# Patient Record
Sex: MALE | NOT HISPANIC OR LATINO | URBAN - METROPOLITAN AREA
[De-identification: names, ages, dates, MRNs, and addresses within clinical notes are randomized per-mention and may not be internally consistent; named-entity substitution may affect disease eponyms.]

---

## 2018-06-21 ENCOUNTER — IMPORTED ENCOUNTER (OUTPATIENT)
Dept: URBAN - METROPOLITAN AREA CLINIC 1 | Facility: CLINIC | Age: 58
End: 2018-06-21

## 2018-06-21 PROBLEM — H40.053: Noted: 2018-06-21

## 2018-06-21 PROBLEM — H52.4: Noted: 2018-06-21

## 2018-06-21 PROBLEM — H52.03: Noted: 2018-06-21

## 2018-06-21 PROCEDURE — S0620 ROUTINE OPHTHALMOLOGICAL EXA: HCPCS

## 2018-06-21 NOTE — PATIENT DISCUSSION
1.  Hyperopia OU -- Finalized Glasses MRx was given to patient for corrective spectacles if indicated2. Presbyopia OU3. Cataracts OU -- Observe 4. OHTN OU (CD: 0.40 / 0.40) -- IOP 23 OU today. Negative family h/o Glaucoma. Will monitor & observe for any changes / progression Return for an appointment in 1 YR for a 40 OU with Dr. Atul Braun.

## 2019-01-10 ENCOUNTER — HOSPITAL ENCOUNTER (OUTPATIENT)
Dept: PHYSICAL THERAPY | Age: 59
Discharge: HOME | End: 2019-01-10
Payer: COMMERCIAL

## 2019-01-10 PROCEDURE — 97162 PT EVAL MOD COMPLEX 30 MIN: CPT

## 2019-01-10 NOTE — PROGRESS NOTES
PT DAILY TREATMENT NOTE/LUMBAR EVAL 10-18    Patient Name: Lyssa Gomez  Date:1/10/2019  : 1960  [x]  Patient  Verified  Payor: Barb Bateman / Plan: [employer] RPN / Product Type: Commerical /    In time:1626  Out time:1708  Total Treatment Time (min): 42  Visit #: 1 of 8-10    Treatment Area: Low back pain [M54.5]  SUBJECTIVE  Pain Level (0-10 scale): 2-310  []constant []intermittent []improving []worsening []no change since onset    Any medication changes, allergies to medications, adverse drug reactions, diagnosis change, or new procedure performed?: [x] No    [] Yes (see summary sheet for update)  Subjective functional status/changes:     PLOF: Independent  Limitations to PLOF: Pt is unable to perform sports and quick movements, preventing pt from playing tennis and snow skiing, normal gait  Mechanism of Injury: Fall  Current symptoms/Complaints: Pt c/o Left quad tightness, tingling from Left low back to foot than began 6 to 8 weeks ago. Pt states that he does limp. Pt attributes pain to a fall. Previous Treatment/Compliance: None for this issue   PMHx/Surgical Hx: Pt has history of neck pain, high blood pressure, hearing impaired (has hearing aids); L foot hammertoe surgery Aug 2018  Work Hx: Office work  Pt Goals:  \"Reduce pain and get back to walking normally\"  Cognition: A & O x 4    Other:    OBJECTIVE/EXAMINATION      42 min [x]Eval                  []Re-Eval        Physical Therapy Evaluation - Lumbar Spine ([facility])    SUBJECTIVE  Chief Complaint: L LE weakness, tightness and tingling from L lumbar/side to foot  Mechanism of injury: Fall    Symptoms:  Aggravated by:   [] Bending [] Sitting [] Standing [] Walking   [] Moving [] Cough [] Sneeze [] Valsalva   [] AM  [x] PM  Lying:  [] sup   [] pro   [x] sidelying   [] Other:     Eased by:    [] Bending [] Sitting [] Standing [x] Walking   [] Moving [] AM  [] PM  Lying: [x] sup  [x] pro  [] sidelying   [] Other:     General Health:  Red Flags Indicated? [] Yes    [x] No  [] Yes [x] No Recent weight change (If yes, due to dieting?  [] Yes  [] No)   [] Yes [x] No Weakness in legs during walking  [] Yes [x] No Unremitting pain at night  [] Yes [x] No Abdominal pain or problems  [] Yes [x] No Rectal bleeding  [x] Yes [] No Feet more cold or painful in cold weather  [] Yes [x] No Menstrual irregularities  [] Yes [x] No Blood or pain with urination  [] Yes [x] No Dysfunction of bowel or bladder  [x] Yes [] No Recent illness within past 3 weeks (i.e, cold, flu) - cold (treated)  [] Yes [x] No Numbness/tingling in buttock/genitalia region    Past History/Treatments:     Diagnostic Tests: [] Lab work [x] X-rays    [] CT [] MRI     [] Other:  Results: nothing significant    Functional Status:  What position do you sleep in?: Sidelying    OBJECTIVE  Posture: forward flexed at hip, R shoulder higher than L  Lateral Shift: [] R    [] L     [] +  [] -  Kyphosis: [] Increased [] Decreased   []  WNL  Lordosis:  [] Increased [] Decreased   [] WNL  Pelvic symmetry: [] WNL    [x] Other:  Standing L hip elevated in standing, even in sitting, even in supine  ASIS even in supine  Stands leaning to left - visible crease    Gait:  [] Normal     [x] Abnormal: Antalgic    Active Movements: [] N/A   [] Too acute   [] Other:  ROM AROM Comments:pain, area   Forward flexion 40-60 43 deg     Extension 20-30 5 deg    SB right 20-30 15    SB left 20-30 6 Rotates to achieve side bending   Rotation right 5-10     Rotation left 5-10     Hip IR limited bilaterally    Neuro Screen [] WNL  Myotome/Dermatome/Reflexes:  Comments: pt reports decreased sensation along L2 distribution to light tough    Dural Mobility:  SLR Sitting: [] R    [] L    [] +    [] -  @ (degrees):           Supine: [] R    [] L    [] +    [x] -  @ (degrees):   Slump Test: [] R    [] L    [] +    [] -  @ (degrees):   Prone Knee Bend: [] R    [] L    [] +    [x] -  For pain, + for increased tension/tightness bilaterally    Palpation: Not TTP    Strength   L(0-5) R (0-5) N/T   Hip Flexion (L1,2) 4+ 4+ []   Knee Extension (L3,4) 5 5 []   Ankle Dorsiflexion (L4) 5 5 []   Great Toe Extension (L5)   []   Ankle Plantarflexion (S1) 5 5 []   Knee Flexion (S1,2) 5 5 []   Upper Abdominals   []   Lower Abdominals   []   Paraspinals   []   Back Rotators   []   Gluteus Luis 4+ 4+ []   Other   []   ER 5 bilat  IR 5 bilat  Special Tests  Lumbar:  Lumb. Compression: [] Pos  [] Neg               Lumbar Distraction:   [] Pos  [] Neg    Quadrant:  [] Pos  [] Neg   [] Flex  [] Ext           Hip: Beather Sl[place]:  [] R    [] L    [] +    [x] -  L tighter than R    Scour:  [] R    [] L    [] +    [x] -     Piriformis: [] R    [] L    [] +    [] -          Deficits: Josefina's: [] R    [] L    [] +    [] -     Irwin: [] R    [] L    [] +    [x] - mild tightness of L    Hamstrings 90/90: R 134, L 137       Pain Level (0-10 scale) post treatment: 2-3    ASSESSMENT/Changes in Function: Pt is 67 yo male who reports to clinic with L LE tingling, weakness, and tightness along the anterior portion which pt attributes to a fall. Pt presents with impaired ROM in extension (5 deg) and side bending L more impaired than R (6 deg and 15 deg respectively). Pt noticeably rotates when trying to perform L side bending. Pt also stands with left lean with visible crease below left rib cage. QLs are tight bilaterally, however pt was not TTP along lumbar spine, gluteals, piriformis, or hip. Weakness was noted in hip extension and flexion with other motions 5/5, however there were no differences between left and right. Pt ambulates with antalgic gait. Pt's deficits are pain affecting function, abnormal gait, decreased activity tolerance, and impaired ROM affecting performance of ADLs and recreation activities.  FOTO score 47    Patient will continue to benefit from skilled PT services to modify and progress therapeutic interventions, address functional mobility deficits, address ROM deficits, address strength deficits, analyze and address soft tissue restrictions, analyze and cue movement patterns, analyze and modify body mechanics/ergonomics and assess and modify postural abnormalities to attain remaining goals.      [x]  See Plan of Care  []  See progress note/recertification  []  See Discharge Summary         Progress towards goals / Updated goals:  Initiate POC    PLAN  []  Upgrade activities as tolerated     []  Continue plan of care  []  Update interventions per flow sheet       []  Discharge due to:_  [x]  Other:_ Initiate Plan of Care     Eval Complexity: History: HIGH Complexity :3+ comorbidities / personal factors will impact the outcome/ POC Exam:HIGH Complexity : 4+ Standardized tests and measures addressing body structure, function, activity limitation and / or participation in recreation  Presentation: MEDIUM Complexity : Evolving with changing characteristics  Clinical Decision Making:MEDIUM Complexity : FOTO score of 26-74Overall Complexity:MEDIUM        Yao Beal PT, DPT  1/10/2019  4:13 PM

## 2019-01-11 NOTE — PROGRESS NOTES
[address] [facility] AT [facility] [address]  Phone [phone]  Fax [phone] PLAN OF CARE / STATEMENT OF MEDICAL NECESSITY FOR PHYSICAL THERAPY SERVICES Patient Name: Genesis Zhao : 1960 Medical  
Diagnosis: Low back pain [M54.5] Treatment Diagnosis: L LE radicular, LBP Onset Date: 6-8 weeks ago Referral Source: Jorge Martins [facility]): 1/10/2019 Prior Hospitalization: See medical history Provider #: [id number] Prior Level of Function: Independent Comorbidities: High blood pressure, hearing impaired, hx of Left foot hammer toe surgery Aug 18 Medications: Verified on Patient Summary List  
The Plan of Care and following information is based on the information from the initial evaluation.  
================================================================================= Assessment / key information:  Pt is 67 yo male who reports to clinic with L LE tingling, weakness, and tightness along the anterior portion which pt attributes to a fall. Pt presents with impaired ROM in extension (5 deg) and side bending L more impaired than R (6 deg and 15 deg respectively). Pt noticeably rotates when trying to perform L side bending. Pt also stands with left lean with visible crease below left rib cage. QLs are tight bilaterally, however pt was not TTP along lumbar spine, gluteals, piriformis, or hip. Weakness was noted in hip extension and flexion with other motions 5/5, however there were no differences between left and right. Pt ambulates with antalgic gait. Pt's deficits are pain affecting function, abnormal gait, decreased activity tolerance, and impaired ROM affecting performance of ADLs and recreation activities. FOTO score 47 
================================================================================= Eval Complexity: History: HIGH Complexity :3+ comorbidities / personal factors will impact the outcome/ POC Exam:HIGH Complexity : 4+ Standardized tests and measures addressing body structure, function, activity limitation and / or participation in recreation  Presentation: MEDIUM Complexity : Evolving with changing characteristics  Clinical Decision Making:MEDIUM Complexity : FOTO score of 26-74Overall Complexity:MEDIUM Problem List: pain affecting function, decrease ROM, decrease strength, impaired gait/ balance, decrease ADL/ functional abilitiies, decrease activity tolerance and decrease flexibility/ joint mobility Treatment Plan may include any combination of the following: Therapeutic exercise, Therapeutic activities, Neuromuscular re-education, Physical agent/modality, Gait/balance training, Manual therapy, Patient education, Self Care training and Functional mobility training Patient / Family readiness to learn indicated by: asking questions Persons(s) to be included in education: patient (P) Barriers to Learning/Limitations: None Measures taken:   
Patient Goal (s): \"reduce pain and get back to walking normally\" Patient self reported health status: good Rehabilitation Potential: good ? Short Term Goals: To be accomplished in  3  treatments: 1. Patient will be independent with HEP to improve performance of ADLs. ? Long Term Goals: To be accomplished in  8-10  treatments: 1. Patient will improve L ROM equal to R in side bending and improve Ext by 5 deg to improve pt's ability to return to recreational activities. 2. Patient will demonstrate normal gain pattern with 0/10 pain 3 visits consecutively to demonstrate improved functional mobility. 3. Patient will report FOTO score of 69 to indicate improved functional mobility. Frequency / Duration:   Patient to be seen  2  times per week for 8-10  treatments: 
Patient / Caregiver education and instruction: other Role of PT in plan of care G-Codes (GP): SUMIT Therapist Signature: Maik Hale Date: 1/10/2019 Certification Period: NA Time: 8:21 PM  
=========================================================================================== I certify that the above Physical Therapy Services are being furnished while the patient is under my care. I agree with the treatment plan and certify that this therapy is necessary. Physician Signature:       Date:      Time:  Please sign and return to [facility] or you may fax the signed copy to [phone]. Thank you.

## 2019-01-14 ENCOUNTER — HOSPITAL ENCOUNTER (OUTPATIENT)
Dept: PHYSICAL THERAPY | Age: 59
Discharge: HOME | End: 2019-01-14
Payer: COMMERCIAL

## 2019-01-14 PROCEDURE — 97110 THERAPEUTIC EXERCISES: CPT

## 2019-01-14 PROCEDURE — 97012 MECHANICAL TRACTION THERAPY: CPT

## 2019-01-14 NOTE — PROGRESS NOTES
PT DAILY TREATMENT NOTE     Patient Name: Jenny Camargo  Date:2019  : 1960  [x]  Patient  Verified  Payor: Valerie Vera / Plan: Darryl Rodriguez RPN / Product Type: Commerical /    In time:7:02  Out time:8:07  Total Treatment Time (min): 65  Visit #: 2 of     Treatment Area: No admission diagnoses are documented for this encounter.     SUBJECTIVE  Pain Level IN: (0-10 scale): 3/10   Pain Level OUT: (0-10 scale) post treatment: 0/10    Any medication changes, allergies to medications, adverse drug reactions, diagnosis change, or new procedure performed?: [x] No    [] Yes (see summary sheet for update)  Subjective functional status/changes:   [] No changes reported  I have mainly tingling in my (L) leg in the front of the thigh vs having pain in the back     OBJECTIVE    Modality rationale: decrease pain and increase tissue extensibility to improve the patients ability to ambulate with a normalized gait pattern   Type Additional Details   [] Estim:  []Unatt       []IFC  []Premod                        []Other:  []w/ice   []w/heat  Position:  Location:   [] Estim: []Att    []TENS instruct  []NMES                    []Other:  []w/US   []w/ice   []w/heat  Position:  Location:   [x]  Traction: [] Cervical       [x]Lumbar                       [x] Prone          []Supine                       []Intermittent   []Continuous  15+ 5 set up Lbs 90#/70#:  [] before manual  [] after manual   []  Ultrasound: []Continuous   [] Pulsed                           []1MHz   []3MHz W/cm2:  Location:   []  Iontophoresis with dexamethasone         Location: [] Take home patch   [] In clinic   []  Ice     []  heat  []  Ice massage  []  Laser   []  Anodyne Position:  Location:   []  Laser with stim  []  Other:  Position:  Location:   []  Vasopneumatic Device Pressure:       [] lo [] med [] hi   Temperature: [] lo [] med [] hi   [] Skin assessment post-treatment:  []intact []redness- no adverse reaction    []redness - adverse reaction:       35/30 min Therapeutic Exercise:  [x] See flow sheet : first follow up visit since initial evaluation - initiated POC per flow sheet   5 min NC for warm up    Rationale: increase ROM, increase strength, improve coordination and improve balance to improve the patients ability to ambulate with     5  min Manual Therapy: Technique:      [] S/DTM []IASTM []PROM [] Passive Stretching   [] Graston:  [] GT 1  [] GT 2 [] GT 3 [] GT 4 [] GT 4 [] GT 5  [] GT 6  [] Sweep [] Fan [] [place]  [] Brush   []  Swivel []J- Stroke [] Scoop []IFraming     []Manual TPR  [] TDN (see objective; actual needle insertion time not billed)  []Jt manipulation:Gr I [] II []  III [] IV[] V[]  Treatment/Area:  Checked SI - presents with (R) posterior and (L) anterior - had patient self correct with PVC tube    Rationale:      decrease pain, increase ROM, increase tissue extensibility and decrease trigger points to improve patient's ability to achieve neutral pelvic alignment         5 With   [x] TE   [] TA   [] neuro   [] other: Patient Education: [x] Review HEP    [x] Progressed/Changed HEP based on: gave update HEP for SI correction and extension based exercises  [] positioning   [] body mechanics   [] transfers   [] heat/ice application    [] Graston Education: Explained the effects and benefits of Graston Technique therapy including potential for post treatment soreness and bruising.   [] Other:           Objective/Functional Measures with Therapist Assessment Noted with Response to Therapy Session:     first follow up visit since initial evaluation -       initiated POC per flow sheet  Patient presents with min (R) posterior and (L) anterior rotation - able to self correct with PVC - gave handout for patient to perform self correction  Patient was able to report some decrease in symptoms with extension based exercise to the front of his thigh  Attempted prone traction as well to assist with decreasing symptoms  Patient presented with extreme weakness with foot drop to the (L) leg with ambulation   Will assess the effects of the pelvic traction - next visit     ASSESSMENT/Changes in Function:     Patient will continue to benefit from skilled PT services to modify and progress therapeutic interventions, address functional mobility deficits, address ROM deficits, address strength deficits, analyze and address soft tissue restrictions, analyze and modify body mechanics/ergonomics and assess and modify postural abnormalities to attain remaining goals. [x]  See Plan of Care  []  See progress note/recertification  []  See Discharge Summary         Progress towards goals / Updated goals: · Short Term Goals: To be accomplished in  3  treatments:  1. Patient will be independent with HEP to improve performance of ADLs. · Long Term Goals: To be accomplished in  8-10  treatments:  1. Patient will improve L ROM equal to R in side bending and improve Ext by 5 deg to improve pt's ability to return to recreational activities. 2. Patient will demonstrate normal gain pattern with 0/10 pain 3 visits consecutively to demonstrate improved functional mobility. 3. Patient will report FOTO score of 69 to indicate improved functional mobility.         PLAN  [x]  Upgrade activities as tolerated     [x]  Continue plan of care  []  Update interventions per flow sheet       []  Discharge due to:_  []  Other:_      Steven Khalil, PTA 1/14/2019  7:07 AM    Future Appointments   Date Time Provider Betina Acevedo   1/16/2019  7:00 AM Mariam Farley, PT [facility]   1/21/2019  7:00 AM Mario Leon, PT [facility]   1/23/2019  7:45 AM [facility] [place] [facility]   1/28/2019  7:00 AM Joleen Thorne, PTA [facility]   1/30/2019  7:00 AM [facility] PT [place] [facility]

## 2019-01-16 ENCOUNTER — HOSPITAL ENCOUNTER (OUTPATIENT)
Dept: PHYSICAL THERAPY | Age: 59
Discharge: HOME | End: 2019-01-16
Payer: COMMERCIAL

## 2019-01-16 PROCEDURE — 97140 MANUAL THERAPY 1/> REGIONS: CPT

## 2019-01-16 PROCEDURE — 97012 MECHANICAL TRACTION THERAPY: CPT

## 2019-01-16 PROCEDURE — 97110 THERAPEUTIC EXERCISES: CPT

## 2019-01-16 NOTE — PROGRESS NOTES
[facility] PT DAILY TREATMENT NOTE     Patient Name: Natalya Anderson  Date:2019  : 1960  [x]  Patient  Verified  Payor: Karen Hammond / Plan: Erwin Gibson RPN / Product Type: Commerical /    In time:705  Out time:825  Total Treatment Time (min): 80  Visit #: 3 of 8-10    Treatment Area: Low back pain [M54.5]    SUBJECTIVE  Pain Level (0-10 scale): 3/10  Any medication changes, allergies to medications, adverse drug reactions, diagnosis change, or new procedure performed?: [x] No    [] Yes (see summary sheet for update)  Subjective functional status/changes:   [] No changes reported  I feel about the same. I did the exercises and the one seemed to irritate my neck.  The tightness I feel in my left leg now extends below the knee    OBJECTIVE  Modality rationale: decrease pain and increase tissue extensibility to improve the patients ability to tolerate prolonged walking and standing    Type Additional Details   [] Estim: []Att   []Unatt  []TENS instruct                 []IFC  []Premod []NMES                       []Other:  []w/US   []w/ice   []w/heat  Position:  Location:   [x]  Traction:  15 minutes + 5 set up  [] Cervical       [x]Lumbar                       [x] Prone          []Supine                       []Intermittent   []Continuous Lbs: 100#/85#   [] before manual  [x] after manual   []  Ultrasound: []Continuous   [] Pulsed                           []1MHz   []3MHz Location:  W/cm2:   []  Iontophoresis with dexamethasone         Location: [] Take home patch   [] In clinic   []  Ice     []  heat  []  Ice massage Position:  Location:   []  Vasopneumatic Device Pressure: [] lo [] med [] hi   Temp: [] lo [] med [] hi   [x] Skin assessment post-treatment:  [x]intact []redness- no adverse reaction       []redness - adverse reaction:       50/45 min Therapeutic Exercise:  [x] See flow sheet : 5 min NC for warm up   Rationale: increase ROM and increase strength to improve the patients ability to tolerate prolonged walking and standing     10 min Manual Therapy:  MET for Right posterior and LEft anterior rotated ilium. Both w PVC then manually. DTM and TrP release to Left QL and Left Gluts/ piriformis    Rationale: decrease pain, increase ROM, increase tissue extensibility and decrease trigger points to improve tolerance to daily activities        min Patient Education: [x] Review HEP    [] Progressed/Changed HEP based on:   [] positioning   [] body mechanics   [] transfers   [] heat/ice application        Other Objective/Functional Measures: Added QL stretch on Left   Modified prone press up to allow C spine in flexion as patient reported increased neck pain with extended cervical spine. Modified prone Glut squeeze to prone heel squeeze to decrease spasm  Increased weight on prone mechanical traction from 90# to 100#. Right LE shorter in supine and in long sit. Increased palpable tension in LEft QL, Left gluts and Left piriformis    Pain Level (0-10 scale) post treatment:  3/10     ASSESSMENT/Changes in Function: Patient tolerated treatment well with no reports of pain during or post therex. Patient continues to present with pelvic obliquity with good pelvic alignment post METs. Patient responds well to mechanical traction in prone with decreased radicular symptoms reported after. Patient will continue to benefit from skilled PT services to modify and progress therapeutic interventions, address functional mobility deficits, address ROM deficits, address strength deficits, analyze and address soft tissue restrictions and assess and modify postural abnormalities to attain remaining goals. []  See Plan of Care  []  See progress note/recertification  []  See Discharge Summary         Progress towards goals / Updated goals: · Short Term Goals: To be accomplished in  3  treatments:  1. Patient will be independent with HEP to improve performance of ADLs.  RESOLVED per patient report 1/16/2019     · Long Term Goals: To be accomplished in  8-10  treatments:  1. Patient will improve L ROM equal to R in side bending and improve Ext by 5 deg to improve pt's ability to return to recreational activities. 2. Patient will demonstrate normal gain pattern with 0/10 pain 3 visits consecutively to demonstrate improved functional mobility. 3. Patient will report FOTO score of 69 to indicate improved functional mobility.     PLAN  [x]  Upgrade activities as tolerated     []  Continue plan of care  []  Update interventions per flow sheet       []  Discharge due to:_  []  Other:_      Bety Stanley, PT 1/16/2019  7:13 AM

## 2019-01-21 ENCOUNTER — HOSPITAL ENCOUNTER (OUTPATIENT)
Dept: PHYSICAL THERAPY | Age: 59
Discharge: HOME | End: 2019-01-21
Payer: COMMERCIAL

## 2019-01-21 PROCEDURE — 97012 MECHANICAL TRACTION THERAPY: CPT | Performed by: PHYSICAL THERAPIST

## 2019-01-21 PROCEDURE — 97140 MANUAL THERAPY 1/> REGIONS: CPT | Performed by: PHYSICAL THERAPIST

## 2019-01-21 PROCEDURE — 97110 THERAPEUTIC EXERCISES: CPT | Performed by: PHYSICAL THERAPIST

## 2019-01-21 NOTE — PROGRESS NOTES
Domenic Crook PT DAILY TREATMENT NOTE 8-    Patient Name: Jeri Antony  Date:2019  : 1960  [x]  Patient  Verified  Payor: Kim Field / Plan: Dorann Alpers RPN / Product Type: Commerical /    In time: 655am  Out time:815am  Total Treatment Time (min): 80  Visit #: 4 of 8-10    Treatment Area: Low back pain [M54.5]    SUBJECTIVE  Pain Level (0-10 scale): 10  Any medication changes, allergies to medications, adverse drug reactions, diagnosis change, or new procedure performed?: [x] No    [] Yes (see summary sheet for update)  Subjective functional status/changes:   [] No changes reported    The sharp sharp pain is mostly when I go to stand up. The dull pain is more with prolonged sitting.      OBJECTIVE   Modality rationale: decrease pain and increase tissue extensibility to improve the patients ability to tolerate prolonged walking and standing     Type Additional Details    [] Estim: []Att   []Unatt  []TENS instruct                 []IFC  []Premod []NMES                       []Other:  []w/US   []w/ice   []w/heat  Position:  Location:   20 [x]  Traction:  15 minutes + 5 set up  [] Cervical       [x]Lumbar                       [x] Prone          []Supine                       []Intermittent   []Continuous Lbs: 100#/85#   [] before manual  [x] after manual    []  Ultrasound: []Continuous   [] Pulsed                           []1MHz   []3MHz Location:  W/cm2:    []  Iontophoresis with dexamethasone         Location: [] Take home patch   [] In clinic    []  Ice     []  heat  []  Ice massage Position:  Location:    []  Vasopneumatic Device Pressure: [] lo [] med [] hi   Temp: [] lo [] med [] hi   [x] Skin assessment post-treatment:  [x]intact []redness- no adverse reaction       []redness - adverse reaction:       45/40 min Therapeutic Exercise:  [x] See flow sheet : 5 min NC for warm up   Rationale: increase ROM and increase strength to improve the patients ability to tolerate prolonged walking and standing 15 min Manual Therapy:   MET for Right upslipped ilium. DTM and TrP release to Left Piriformis and QL   Rationale: decrease pain, increase ROM, increase tissue extensibility and decrease trigger points to improve tolerance to daily activities        min Patient Education: [x] Review HEP    [] Progressed/Changed HEP based on:   [] positioning   [] body mechanics   [] transfers   [] heat/ice application        Other Objective/Functional Measures:   Performed B QL stretches, R upslip noted: MT performed, added TA education with stability ball/band therex initiation. Pain Level (0-10 scale) post treatment:  3/10     ASSESSMENT/Changes in Function:    Patient did well with addition of stability therex for SI. Sx in bottom of foot (no change) with prone activities and tx today. Progress core stability as tolerated  Patient will continue to benefit from skilled PT services to modify and progress therapeutic interventions, address functional mobility deficits, address ROM deficits, address strength deficits, analyze and address soft tissue restrictions and assess and modify postural abnormalities to attain remaining goals. []  See Plan of Care  []  See progress note/recertification  []  See Discharge Summary         Progress towards goals / Updated goals: · Short Term Goals: To be accomplished in  3  treatments:  1. Patient will be independent with HEP to improve performance of ADLs. RESOLVED per patient report 1/16/2019     · Long Term Goals: To be accomplished in  8-10  treatments:  1. Patient will improve L ROM equal to R in side bending and improve Ext by 5 deg to improve pt's ability to return to recreational activities. 2. Patient will demonstrate normal gain pattern with 0/10 pain 3 visits consecutively to demonstrate improved functional mobility. Continues with mild antalgia. 1/21/19  3. Patient will report FOTO score of 69 to indicate improved functional mobility.     PLAN  [x]  Upgrade activities as tolerated     []  Continue plan of care  []  Update interventions per flow sheet       []  Discharge due to:_  []  Other:_      Hannah Hampton, PT 1/21/2019  7:13 AM

## 2019-01-23 ENCOUNTER — HOSPITAL ENCOUNTER (OUTPATIENT)
Dept: PHYSICAL THERAPY | Age: 59
Discharge: HOME | End: 2019-01-23
Payer: COMMERCIAL

## 2019-01-23 PROCEDURE — 97140 MANUAL THERAPY 1/> REGIONS: CPT | Performed by: PHYSICAL THERAPIST

## 2019-01-23 PROCEDURE — 97012 MECHANICAL TRACTION THERAPY: CPT | Performed by: PHYSICAL THERAPIST

## 2019-01-23 PROCEDURE — 97110 THERAPEUTIC EXERCISES: CPT | Performed by: PHYSICAL THERAPIST

## 2019-01-23 NOTE — PROGRESS NOTES
[facility]e Suarez PT DAILY TREATMENT NOTE 8    Patient Name: Felicia Card  Date:2019  : 1960  [x]  Patient  Verified  Payor: Marielena Hernandez / Plan: Ekta Johnson RPN / Product Type: Commerical /    In time: 715am  Out time:840am  Total Treatment Time (min): 85  Visit #: 5 of 8-10    Treatment Area: Low back pain [M54.5]    SUBJECTIVE  Pain Level (0-10 scale): 2/10  Any medication changes, allergies to medications, adverse drug reactions, diagnosis change, or new procedure performed?: [x] No    [] Yes (see summary sheet for update)  Subjective functional status/changes:   [] No changes reported  \" I am back on the Ibuprophen \"    OBJECTIVE   Modality rationale: decrease pain and increase tissue extensibility to improve the patients ability to tolerate prolonged walking and standing     Type Additional Details    [] Estim: []Att   []Unatt  []TENS instruct                 []IFC  []Premod []NMES                       []Other:  []w/US   []w/ice   []w/heat  Position:  Location:   16 [x]  Traction:   set up  [] Cervical       [x]Lumbar                       [x] Prone          []Supine                       []Intermittent   []Continuous Lbs: 105#/85#   [] before manual  [x] after manual         54/44 min Therapeutic Exercise:  [x] See flow sheet : 10 min NC for warm up/ added TB walkaways today   Rationale: increase ROM and increase strength to improve the patients ability to tolerate prolonged walking and standing     15 min Manual Therapy:   MET for Right upslipped/post rot ilium.  DTM and TrP release  QL   Rationale: decrease pain, increase ROM, increase tissue extensibility and decrease trigger points to improve tolerance to daily activities        min Patient Education: [x] Review HEP    [] Progressed/Changed HEP based on:   [] positioning   [] body mechanics   [] transfers   [] heat/ice application        Other Objective/Functional Measures:   Initiated TA TB walk aways with GTB with good tolerance and form    Pain Level (0-10 scale) post treatment:  2/10     ASSESSMENT/Changes in Function:    Core stability therex progressed with good form, pt reports mild reduction in LE sx with prone activities, added sag to press ups today. Increased Tx to 105# max pull. Patient will continue to benefit from skilled PT services to modify and progress therapeutic interventions, address functional mobility deficits, address ROM deficits, address strength deficits, analyze and address soft tissue restrictions and assess and modify postural abnormalities to attain remaining goals. []  See Plan of Care  []  See progress note/recertification  []  See Discharge Summary         Progress towards goals / Updated goals: · Short Term Goals: To be accomplished in  3  treatments:  1. Patient will be independent with HEP to improve performance of ADLs. RESOLVED per patient report 1/16/2019     · Long Term Goals: To be accomplished in  8-10  treatments:  1. Patient will improve L ROM equal to R in side bending and improve Ext by 5 deg to improve pt's ability to return to recreational activities. 2. Patient will demonstrate normal gain pattern with 0/10 pain 3 visits consecutively to demonstrate improved functional mobility. Continues with mild antalgia. 1/21/19  3. Patient will report FOTO score of 69 to indicate improved functional mobility.     PLAN  [x]  Upgrade activities as tolerated     []  Continue plan of care  []  Update interventions per flow sheet       []  Discharge due to:_  [x]  Other:_check goals jean claude Blue, PT 1/23/2019  7:13 AM

## 2019-01-28 ENCOUNTER — HOSPITAL ENCOUNTER (OUTPATIENT)
Dept: PHYSICAL THERAPY | Age: 59
Discharge: HOME | End: 2019-01-28
Payer: COMMERCIAL

## 2019-01-28 PROCEDURE — 97140 MANUAL THERAPY 1/> REGIONS: CPT

## 2019-01-28 PROCEDURE — 97012 MECHANICAL TRACTION THERAPY: CPT

## 2019-01-28 PROCEDURE — 97110 THERAPEUTIC EXERCISES: CPT

## 2019-01-28 NOTE — PROGRESS NOTES
Erlin Steinberg PT DAILY TREATMENT NOTE     Patient Name: Soraya Rubin  Date:2019  : 1960  [x]  Patient  Verified  Payor: Bk Broussard / Plan: Yosvany Bloom RPN / Product Type: Commerical /    In time: 7:04am  Out time:8:14  Total Treatment Time (min): 70  Visit #: 6 of 8-10    Treatment Area: Low back pain [M54.5]    SUBJECTIVE  Pain Level (0-10 scale): 4/10  Any medication changes, allergies to medications, adverse drug reactions, diagnosis change, or new procedure performed?: [x] No    [] Yes (see summary sheet for update)  Subjective functional status/changes:   [] No changes reported  I can tell that I am walking better and I feel more strength in the (L) leg but I am still really sore in my (L) side and low back especially after the traction     OBJECTIVE   Modality rationale: decrease pain and increase tissue extensibility to improve the patients ability to tolerate prolonged walking and standing    Type Additional Details   [] Estim: []Att   []Unatt  []TENS instruct                 []IFC  []Premod []NMES                       []Other:  []w/US   []w/ice   []w/heat  Position:  Location:   [x]  Traction:   set up  [] Cervical       [x]Lumbar                       [x] Prone          []Supine                       []Intermittent   []Continuous Lbs: 105#/85#   [] before manual  [x] after manual  15 minutes          40/30 min Therapeutic Exercise:  [x] See flow sheet : 10 min NC for warm up   Rationale: increase ROM and increase strength to improve the patients ability to tolerate prolonged walking and standing     15 min Manual Therapy:   MET for Right upslipped/post rot ilium.  DTM and TrP release  QL to (L) QL   Rationale: decrease pain, increase ROM, increase tissue extensibility and decrease trigger points to improve tolerance to daily activities        min Patient Education: [x] Review HEP    [] Progressed/Changed HEP based on:   [] positioning   [] body mechanics   [] transfers   [] heat/ice application Other Objective/Functional Measures:   Patient reported improved strength in the (L) LE as well as improved gait pattern noted with improved active DF and controlling the foot with heelstrike as well   Continues to present with SI dysfunction and requires MET to correct mis-alignment. And tightness in the (L) QL     LTG#2  2. Patient will demonstrate normal gain pattern with 0/10 pain 3 visits consecutively to demonstrate improved functional mobility. Continues with mild antalgia. 1/28/19  Pain Level (0-10 scale) post treatment:  2/10     ASSESSMENT/Changes in Function:    Patient will continue to benefit from skilled PT services to modify and progress therapeutic interventions, address functional mobility deficits, address ROM deficits, address strength deficits, analyze and address soft tissue restrictions and assess and modify postural abnormalities to attain remaining goals. []  See Plan of Care  []  See progress note/recertification  []  See Discharge Summary         Progress towards goals / Updated goals: · Short Term Goals: To be accomplished in  3  treatments:  1. Patient will be independent with HEP to improve performance of ADLs. RESOLVED per patient report 1/16/2019     · Long Term Goals: To be accomplished in  8-10  treatments:  1. Patient will improve L ROM equal to R in side bending and improve Ext by 5 deg to improve pt's ability to return to recreational activities. 2. Patient will demonstrate normal gain pattern with 0/10 pain 3 visits consecutively to demonstrate improved functional mobility. Continues with mild antalgia. 1/28/19  3. Patient will report FOTO score of 69 to indicate improved functional mobility.     PLAN  [x]  Upgrade activities as tolerated     []  Continue plan of care  []  Update interventions per flow sheet       []  Discharge due to:_  []  Other:      Isabela Agrawal, ROMMEL 1/28/2019  7:13 AM

## 2019-01-30 ENCOUNTER — HOSPITAL ENCOUNTER (OUTPATIENT)
Dept: PHYSICAL THERAPY | Age: 59
Discharge: HOME | End: 2019-01-30
Payer: COMMERCIAL

## 2019-01-30 PROCEDURE — 97110 THERAPEUTIC EXERCISES: CPT

## 2019-01-30 PROCEDURE — 97140 MANUAL THERAPY 1/> REGIONS: CPT

## 2019-01-30 PROCEDURE — 97012 MECHANICAL TRACTION THERAPY: CPT

## 2019-01-30 NOTE — PROGRESS NOTES
Brody Batista PT DAILY TREATMENT NOTE     Patient Name: Caroline Tran  Date:2019  : 1960  [x]  Patient  Verified  Payor: Trevor Luis Fernando / Plan: Liz Sheppard RPN / Product Type: Commerical /    In time: 0700  Out time: 0820  Total Treatment Time (min): 80  Visit #: 87of 8-10    Treatment Area: Low back pain [M54.5]    SUBJECTIVE  Pain Level (0-10 scale): 4/10  Any medication changes, allergies to medications, adverse drug reactions, diagnosis change, or new procedure performed?: [x] No    [] Yes (see summary sheet for update)  Subjective functional status/changes:   [] No changes reported  Pt reports that his pain is predominantly in the morning when he first gets up. OBJECTIVE   Modality rationale: decrease pain and increase tissue extensibility to improve the patients ability to tolerate prolonged walking and standing    Additional Details    Estim:[][]  TENS instruct  [] Premod NMES[][]w/US   []w/ice   []w/heat  Position:  Location:     Traction: set up [][x]                     X Prone          Supine  [][]Lbs: 105#/95#   [] before manual  [x] after manual  15 minutes + 10 min set up/release         40/35 min Therapeutic Exercise:  [x] See flow sheet : 5 min NC for warm up   Rationale: increase ROM and increase strength to improve the patients ability to tolerate prolonged walking and standing     15 min Manual Therapy:   MET for Right upslipped/post rot ilium. DTM and TrP release to B QL   Rationale: decrease pain, increase ROM, increase tissue extensibility and decrease trigger points to improve tolerance to daily activities        min Patient Education: [x] Review HEP    [] Progressed/Changed HEP based on:   [] positioning   [] body mechanics   [] transfers   [] heat/ice application        Other Objective/Functional Measures:   R Post Rot/Upslip -  Corrected after manual and MET  Pt reports tingling symptoms in L LE have reduced and that they do not travel as far down his LE.    Tx did not reduce symptoms today. ASSESSMENT/Changes in Function:  Pt continues to have radicular symptoms but pt self reports that they do not travel as far down indicating centralization of the symptoms. Today tx did not reduce symptoms but after MARBIN and prone press-ups, the pt's symptoms reduced from 4 to 3. Patient will continue to benefit from skilled PT services to modify and progress therapeutic interventions, address functional mobility deficits, address ROM deficits, address strength deficits, analyze and address soft tissue restrictions and assess and modify postural abnormalities to attain remaining goals. []  See Plan of Care  []  See progress note/recertification  []  See Discharge Summary         Progress towards goals / Updated goals: · Short Term Goals: To be accomplished in  3  treatments:  1. Patient will be independent with HEP to improve performance of ADLs. RESOLVED per patient report 1/16/2019     · Long Term Goals: To be accomplished in  8-10  treatments:  1. Patient will improve L ROM equal to R in side bending and improve Ext by 5 deg to improve pt's ability to return to recreational activities. 2. Patient will demonstrate normal gain pattern with 0/10 pain 3 visits consecutively to demonstrate improved functional mobility. Pt continues to have radicular symptoms but reports that they do not travel as far down his leg. 1/30/19  3. Patient will report FOTO score of 69 to indicate improved functional mobility.     PLAN  [x]  Upgrade activities as tolerated     []  Continue plan of care  []  Update interventions per flow sheet       []  Discharge due to:_  []  Other:     Marguerite Chang, PT, DPT  1/30/2019  5731 AM

## 2019-02-04 ENCOUNTER — HOSPITAL ENCOUNTER (OUTPATIENT)
Dept: PHYSICAL THERAPY | Age: 59
Discharge: HOME | End: 2019-02-04
Payer: COMMERCIAL

## 2019-02-04 PROCEDURE — 97012 MECHANICAL TRACTION THERAPY: CPT

## 2019-02-04 PROCEDURE — 97110 THERAPEUTIC EXERCISES: CPT

## 2019-02-04 PROCEDURE — 97140 MANUAL THERAPY 1/> REGIONS: CPT

## 2019-02-04 NOTE — PROGRESS NOTES
Laith Solitario PT DAILY TREATMENT NOTE 8    Patient Name: Berkley Plaza  Date:2019  : 1960  [x]  Patient  Verified  Payor: Sergio Schulte / Plan: Piyush Knight RPN / Product Type: Commerical /    In time: 7:00  Out time: 8:18  Total Treatment Time (min): 78  Visit #: 8 of 8-10    Treatment Area: Low back pain [M54.5]    SUBJECTIVE  Pain Level (0-10 scale):  6/10  Post pain level (0-10 scale) 0/10  Any medication changes, allergies to medications, adverse drug reactions, diagnosis change, or new procedure performed?: [x] No    [] Yes (see summary sheet for update)  Subjective functional status/changes:   [] No changes reported  I feel like I am regressing a bit because since the last time I was here I am having my pain in my (L) hip and groin area especially when I change position from like sitting to standing or getting out of my car. OBJECTIVE   Modality rationale: decrease pain and increase tissue extensibility to improve the patients ability to tolerate prolonged walking and standing    Additional Details    Estim:[][]  TENS instruct  [] Premod NMES[][]w/US   []w/ice   []w/heat  Position:  Location:     Traction: set up [][x]                      Prone          Supine X  [][]Lbs: 105#/95#   [] before manual  [x] after manual  15 minutes + 5 min set up/release         38/33 min Therapeutic Exercise:  [x] See flow sheet : 5 min NC for warm up   Rationale: increase ROM and increase strength to improve the patients ability to tolerate prolonged walking and standing     15 min Manual Therapy:   MET for Right upslipped.  DTM and TrP release to L QL   Rationale: decrease pain, increase ROM, increase tissue extensibility and decrease trigger points to improve tolerance to daily activities       5 min Patient Education: [x] Review HEP    [] Progressed/Changed HEP based on:   [] positioning   [] body mechanics   [] transfers   [] heat/ice application  - inserted a heel lift in the (L) shoe today       Other Objective/Functional Measures:   R up-slip with no rotation - corrected with leg pull  Release (L) QL and inserted a heel lift to the (L) shoe to assist with SI dysfunction and up-slip as well as attempting to decrease lumbar/groin/hip pain  Secondary to increase pain in the (L) hip/groing and LB today since last PT visit - attempted to decrease this pain by changing lumbar traction to supine vs prone and added heel lift to (L) shoe  Patient reported no pain or discomfort after supine traction    ASSESSMENT/Changes in Function:    Patient will continue to benefit from skilled PT services to modify and progress therapeutic interventions, address functional mobility deficits, address ROM deficits, address strength deficits, analyze and address soft tissue restrictions and assess and modify postural abnormalities to attain remaining goals. []  See Plan of Care  []  See progress note/recertification  []  See Discharge Summary         Progress towards goals / Updated goals: · Short Term Goals: To be accomplished in  3  treatments:  1. Patient will be independent with HEP to improve performance of ADLs. RESOLVED per patient report 1/16/2019     · Long Term Goals: To be accomplished in  8-10  treatments:  1. Patient will improve L ROM equal to R in side bending and improve Ext by 5 deg to improve pt's ability to return to recreational activities. 2. Patient will demonstrate normal gain pattern with 0/10 pain 3 visits consecutively to demonstrate improved functional mobility. Pt continues to have radicular symptoms but reports that they do not travel as far down his leg. 1/30/19  3. Patient will report FOTO score of 69 to indicate improved functional mobility.     PLAN  [x]  Upgrade activities as tolerated     []  Continue plan of care  []  Update interventions per flow sheet       []  Discharge due to:_  []  Other:     Ancelmo Cruz PTA,  2/4/2019  0713 AM

## 2019-02-06 ENCOUNTER — HOSPITAL ENCOUNTER (OUTPATIENT)
Dept: PHYSICAL THERAPY | Age: 59
Discharge: HOME | End: 2019-02-06
Payer: COMMERCIAL

## 2019-02-06 PROCEDURE — 97110 THERAPEUTIC EXERCISES: CPT

## 2019-02-06 PROCEDURE — 97012 MECHANICAL TRACTION THERAPY: CPT

## 2019-02-06 PROCEDURE — 97140 MANUAL THERAPY 1/> REGIONS: CPT

## 2019-02-06 NOTE — PROGRESS NOTES
Joceline Brown PT DAILY TREATMENT NOTE     Patient Name: Lloyd Bonds  Date:2019  : 1960  [x]  Patient  Verified  Payor: Colten Salinas / Plan: Moshe Rincon RPN / Product Type: Commerical /    In time: 884  Out time:  855  Total Treatment Time (min): 72  Visit #: 9 of 8-10    Treatment Area: Low back pain [M54.5]    SUBJECTIVE  Pain Level (0-10 scale):  3/10  Post pain level (0-10 scale) 3/10  Any medication changes, allergies to medications, adverse drug reactions, diagnosis change, or new procedure performed?: [x] No    [] Yes (see summary sheet for update)     Subjective functional status/changes:   [] No changes reported  Patient reports that he had some relief from pain after last treatment session    OBJECTIVE   Modality rationale: decrease pain and increase tissue extensibility to improve the patients ability to tolerate prolonged walking and standing    Additional Details    Estim:[][]  TENS instruct  [] Premod NMES[][]w/US   []w/ice   []w/heat  Position:  Location:     Traction: set up [][x]                      Prone          Supine X  [][]Lbs: 105#/95#   [] before manual  [x] after manual  15 minutes + 5 min set up/release         42/32 min Therapeutic Exercise:  [x] See flow sheet : 5 min NC for warm up. 5 min wait on PT    Rationale: increase ROM and increase strength to improve the patients ability to tolerate prolonged walking and standing     10 min Manual Therapy:   MET for Right upslipped. DTM and TrP release to L QL   Rationale: decrease pain, increase ROM, increase tissue extensibility and decrease trigger points to improve tolerance to daily activities        min Patient Education: [x] Review HEP    [] Progressed/Changed HEP based on:   [] positioning   [] body mechanics   [] transfers   [] heat/ice application      Other Objective/Functional Measures:   Leg pull in supine for up slipped on Right.  Mild TrP noted along Left QL    ASSESSMENT/Changes in Function:  Patient presents with increased tolerance to activity but continued mal alignment of pelvis. Patient will continue to benefit from skilled PT services to modify and progress therapeutic interventions, address functional mobility deficits, address ROM deficits, address strength deficits, analyze and address soft tissue restrictions and assess and modify postural abnormalities to attain remaining goals. []  See Plan of Care  []  See progress note/recertification  []  See Discharge Summary         Progress towards goals / Updated goals: · Short Term Goals: To be accomplished in  3  treatments:  1. Patient will be independent with HEP to improve performance of ADLs. RESOLVED per patient report 1/16/2019     · Long Term Goals: To be accomplished in  8-10  treatments:  1. Patient will improve L ROM equal to R in side bending and improve Ext by 5 deg to improve pt's ability to return to recreational activities. 2. Patient will demonstrate normal gain pattern with 0/10 pain 3 visits consecutively to demonstrate improved functional mobility. Progressing with noted decrease in gait deviations. Continued reports of radicular pain 3/10 pre and post treatment. 2/6/2019  3. Patient will report FOTO score of 69 to indicate improved functional mobility.     PLAN  [x]  Upgrade activities as tolerated     [x]  Continue plan of care  []  Update interventions per flow sheet       []  Discharge due to:_  []  Other:     Bety Stanley, PT,  2/6/2019  0713 AM

## 2019-02-11 ENCOUNTER — HOSPITAL ENCOUNTER (OUTPATIENT)
Dept: PHYSICAL THERAPY | Age: 59
Discharge: HOME | End: 2019-02-11
Payer: COMMERCIAL

## 2019-02-11 PROCEDURE — 97140 MANUAL THERAPY 1/> REGIONS: CPT

## 2019-02-11 PROCEDURE — 97110 THERAPEUTIC EXERCISES: CPT

## 2019-02-11 PROCEDURE — 97014 ELECTRIC STIMULATION THERAPY: CPT

## 2019-02-11 NOTE — PROGRESS NOTES
[facility] PT DAILY TREATMENT NOTE 8-    Patient Name: Janki Borjas  Date:2019  : 1960  [x]  Patient  Verified  Payor: Felicita Childress / Plan: [id number] [employer] RPN / Product Type: Commerical /    In time: 7:05  Out time: 8:10  Total Treatment Time (min): 65  Visit #: 10 of 10    Treatment Area: Low back pain [M54.5]    SUBJECTIVE  Pain Level (0-10 scale):  3/10  Post pain level (0-10 scale) 2/10  Any medication changes, allergies to medications, adverse drug reactions, diagnosis change, or new procedure performed?: [x] No    [] Yes (see summary sheet for update)  Subjective functional status/changes:   [] No changes reported  I was okay until I got out of the car this morning and then I had a sharp pain in my back   I did not like the heel lift but I did like the change in the position of traction that did seem to help me some. OBJECTIVE   Modality rationale: decrease pain and increase tissue extensibility to improve the patients ability to tolerate prolonged walking and standing    Additional Details    Estim:[x][]  IFC   [] Premod NMES[][]w/US   []w/ice   [x]w/heat  Position: supine  Location: to (B) lumbarscaral area   15 minutes     Traction: [][]                      Prone          Supine X  [][]Lbs: 105#/95#   [] before manual  [] after manual           25/20 min Therapeutic Exercise:  [x] See flow sheet : 5 min NC for warm up   Rationale: increase ROM and increase strength to improve the patients ability to tolerate prolonged walking and standing     15 min Manual Therapy:   MET for Right upslipped.  MET to correct SI rotation in prone and then (L) on (L) sacral torsion   Rationale: decrease pain, increase ROM, increase tissue extensibility and decrease trigger points to improve tolerance to daily activities       10 min Patient Education: [x] Review HEP    [] Progressed/Changed HEP based on:   [] positioning   [] body mechanics   [] transfers   [] heat/ice application  -FOTO and reassessment        Other Objective/Functional Measures:   Patient reports overall 40% improvement since starting   FOTO score is 54/100 was 47/100 on evaluation  (L) on (L) sacral torsion and (R) up-slip    Moved the heel lift from (L) shoe to (R) secondary to no change in overall symtoms with heel lift in the (L) side  Patient did report feeling something \"losen\" after METs to correct sacral torsion    (B) hip flexion 4+/5, (B) hip extension 4/5 and (B) hip abduction 4+/5  Lumbar extension was 5 degrees - 20 degrees today  SB (R) 15 and (L) 10 - was (R) 15 and (L) 6 on evaluation    Please refer to progress report      ASSESSMENT/Changes in Function:    Patient will continue to benefit from skilled PT services to modify and progress therapeutic interventions, address functional mobility deficits, address ROM deficits, address strength deficits, analyze and address soft tissue restrictions and assess and modify postural abnormalities to attain remaining goals. []  See Plan of Care  [x]  See progress note/recertification  []  See Discharge Summary         Progress towards goals / Updated goals: · Short Term Goals: To be accomplished in  3  treatments:  1. Patient will be independent with HEP to improve performance of ADLs. RESOLVED per patient report 1/16/2019     · Long Term Goals: To be accomplished in  8-10  treatments:  1. Patient will improve L ROM equal to R in side bending and improve Ext by 5 deg to improve pt's ability to return to recreational activities. progressing towards 2/11/19  2. Patient will demonstrate normal gain pattern with 0/10 pain 3 visits consecutively to demonstrate improved functional mobility. Pt continues to have radicular symptoms but reports that they do not travel as far down his leg. 1/30/19  3. Patient will report FOTO score of 69 to indicate improved functional mobility. progressing towards 2/11/19    PLAN  [x]  Upgrade activities as tolerated     []  Continue plan of care  []  Update interventions per flow sheet       []  Discharge due to:_  [x]  Other:please refer to progress report      Pito Lee PTA,  2/11/2019  0713 AM

## 2019-02-11 NOTE — PROGRESS NOTES
[facility]  [address] [place] [address],[address], [place], [place]  Phone [phone]  Fax [phone]  PROGRESS NOTE  Patient Name: Jalil Lin : 1960   Treatment/Medical Diagnosis: Low back pain [M54.5]   [facility]     Date of Initial Visit: 1/10/19 Attended Visits: 10 Missed Visits: 0     SUMMARY OF TREATMENT  Patient has received physical therapy for LBP since 1/10/19. Treatment has included therapeutic stretching, strengthen, activities, manual/massage, lumbar traction - prone and supine, SI corrections, heel lift to assist with (R) up-slip and modalities as need to assist with decreasing pain and increasing function. CURRENT STATUS  Patient has completed 10 visits  Patient reports overall 40% improvement since beginning therapy  FOTO (Functional Status Summary)  score is 54/100 was 47/100 initial evaluation - indication of overall functional improvement. (L) on (L) sacral torsion and (R) up-slip    Moved the heel lift from (L) shoe to (R) secondary to no change in overall symtoms with heel lift in the (L) side  Patient did report feeling something \"losen\" after METs to correct sacral torsion    (B) hip flexion 4+/5, (B) hip extension 4/5 and (B) hip abduction 4+/5  Lumbar extension was 5 degrees - 20 degrees today  SB (R) 15 and (L) 10 - was (R) 15 and (L) 6 on evaluation  Patient continues to present with decrease active DF with ambulation and a slight trendelenburg gait pattern with ambulation      Patient's remaining chief c/o is sharp pain the in the lumbar area with transferring out of the car and weakness in the (L) LE especially with active DF    Progress towards goals / Updated goals: · Short Term Goals: To be accomplished in  3  treatments:  1. Patient will be independent with HEP to improve performance of ADLs. RESOLVED per patient report 2019      · Long Term Goals:  To be accomplished in  8-10  treatments:  1. Patient will improve L ROM equal to R in side bending and improve Ext by 5 deg to improve pt's ability to return to recreational activities. progressing towards 2/11/19  2. Patient will demonstrate normal gain pattern with 0/10 pain 3 visits consecutively to demonstrate improved functional mobility. Pt continues to have radicular symptoms but reports that they do not travel as far down his leg. 1/30/19  3. Patient will report FOTO score of 69 to indicate improved functional mobility. progressing towards 2/11/19        New Goals to be achieved in 4 weeks:  Long-term Goals: 4 weeks  1. Patient will improve L ROM equal to R in side bending and improve Ext by 5 deg to improve pt's ability to return to recreational activities      Progress Note (2/11/19): 10 degrees       Current:     2. Patient will report FOTO score of 69 to indicate improved functional mobility      Progress Note (2/11/19): 54/100      Current:     3. Increase (B) hip extension, abduction and flexion strength to 5/5 to improve ability with ambulation and decrease trendelenburg pattern      Progress Note (2/11/19): (B) hip flexion 4+/5, (B) hip extension 4/5 and (B) hip abduction 4+/5      Current:     RECOMMENDATIONS  Continue with above POC for 2 to 3 times a week for 4 weeks to achieve above goals  Encourage patient to contact MD office regarding f/u appointment for possible additional testing or advise regarding only minimal improvement with therapy     If you have any questions/comments please contact us directly at [phone]. Thank you for allowing us to assist in the care of your patient. Therapist Signature: Joyce Greenfield PTA Date: 2/11/2019     Time: 8:31 AM   NOTE TO PHYSICIAN:  PLEASE COMPLETE THE ORDERS BELOW AND FAX TO   [facility]: [phone].   If you are unable to process this request in 24 hours please contact our office: [phone].    ___ I have read the above report and request that my patient continue as recommended.   ___ I have read the above report and request that my patient continue therapy with the following changes/special instructions:_________________________________________________________   ___ I have read the above report and request that my patient be discharged from therapy.      Physician Signature:        Date:       Time:

## 2019-02-13 ENCOUNTER — HOSPITAL ENCOUNTER (OUTPATIENT)
Dept: PHYSICAL THERAPY | Age: 59
Discharge: HOME | End: 2019-02-13
Payer: COMMERCIAL

## 2019-02-13 PROCEDURE — 97140 MANUAL THERAPY 1/> REGIONS: CPT

## 2019-02-13 PROCEDURE — 97014 ELECTRIC STIMULATION THERAPY: CPT

## 2019-02-13 PROCEDURE — 97110 THERAPEUTIC EXERCISES: CPT

## 2019-02-13 NOTE — PROGRESS NOTES
[facility] PT DAILY TREATMENT NOTE     Patient Name: Miranda Adorno  Date:2019  : 1960  [x]  Patient  Verified  Payor: Josette Chong / Plan: Manuela Rodriguez RPN / Product Type: Commerical /    In time: 745  Out time: 845  Total Treatment Time (min): 60  Visit #: 1 of 8-10 (11)    Treatment Area: Low back pain [M54.5]    SUBJECTIVE  Pain Level (0-10 scale):  310  Post pain level (0-10 scale) 210  Any medication changes, allergies to medications, adverse drug reactions, diagnosis change, or new procedure performed?: [x] No    [] Yes (see summary sheet for update)  Subjective functional status/changes:   [] No changes reported  Pt stated that he felt better after last treatment but the pain has returned. Pt called MD yesterday to schedule MRI, awaiting return phone call.     OBJECTIVE   Modality rationale: decrease pain and increase tissue extensibility to improve the patients ability to tolerate prolonged walking and standing    Additional Details    Estim:[x][x]  IFC  [] Premod NMES[][]w/US   []w/ice   [x]w/heat  Position: supine  Location: Lumbar spine (L)  15 min + 5 min set up     Traction: set up [][]                      Prone          Supine X  [][]Lbs: 105#/95#   [] before manual  [] after manual         37/32 min Therapeutic Exercise:  [x] See flow sheet : 5 min NC for warm up   Rationale: increase ROM and increase strength to improve the patients ability to tolerate prolonged walking and standing     08 min Manual Therapy:   MET for L on L Sacral Torsion   Rationale: decrease pain, increase ROM, increase tissue extensibility and decrease trigger points to improve tolerance to daily activities        min Patient Education: [x] Review HEP    [] Progressed/Changed HEP based on:   [] positioning   [] body mechanics   [] transfers   [] heat/ice application        Other Objective/Functional Measures:   MET for L on L sacral torsion   No change after manual  Pt demonstrated improved DF during ambulation with VC's  Dec PF and toe off with ambulation    ASSESSMENT/Changes in Function:  Pt continues to have abnormal gait and pain secondary to hip/SI dysfunction. Pt's ambulation did improve with VCs to increase DF but still presents with dec toe off secondary to decreased functional PF strength as demonstrated by limited heel raise. Continue POC to address hip/SI dysfunction to improve gait and decrease pain. PT services to modify and progress therapeutic interventions, address functional mobility deficits, address ROM deficits, address strength deficits, analyze and address soft tissue restrictions and assess and modify postural abnormalities to attain remaining goals. []  See Plan of Care  []  See progress note/recertification  []  See Discharge Summary         Progress towards goals / Updated goals:  1. Patient will improve L ROM equal to R in side bending and improve Ext by 5 deg to improve pt's ability to return to recreational activities      Progress Note (2/11/19): 10 degrees       Current:     2. Patient will report FOTO score of 69 to indicate improved functional mobility      Progress Note (2/11/19): 54/100      Current:     3.  Increase (B) hip extension, abduction and flexion strength to 5/5 to improve ability with ambulation and decrease trendelenburg pattern      Progress Note (2/11/19): (B) hip flexion 4+/5, (B) hip extension 4/5 and (B) hip abduction 4+/5      Current:     PLAN  [x]  Upgrade activities as tolerated     [x]  Continue plan of care  []  Update interventions per flow sheet       []  Discharge due to:_  []  Other:     June Suarez, PT, DPT  2/13/2019  0745 AM

## 2019-02-18 ENCOUNTER — HOSPITAL ENCOUNTER (OUTPATIENT)
Dept: PHYSICAL THERAPY | Age: 59
Discharge: HOME | End: 2019-02-18
Payer: COMMERCIAL

## 2019-02-18 PROCEDURE — 97110 THERAPEUTIC EXERCISES: CPT

## 2019-02-18 PROCEDURE — 97014 ELECTRIC STIMULATION THERAPY: CPT

## 2019-02-18 PROCEDURE — 97140 MANUAL THERAPY 1/> REGIONS: CPT

## 2019-02-18 NOTE — PROGRESS NOTES
[facility] PT DAILY TREATMENT NOTE     Patient Name: Jeri Antony  Date:2019  : 1960  [x]  Patient  Verified  Payor: Kim Field / Plan: Dorann Alpers RPN / Product Type: Commerical /    In time: 7:03  Out time:8:05  Total Treatment Time (min): 62  Visit #: 2 of 8-10    Treatment Area: Low back pain [M54.5]    SUBJECTIVE  Pain Level (0-10 scale):  5/10  Post pain level (0-10 scale)   Any medication changes, allergies to medications, adverse drug reactions, diagnosis change, or new procedure performed?: [x] No    [] Yes (see summary sheet for update)  Subjective functional status/changes:   [] No changes reported  Still waiting on the MRI - I did a lot of walking in the stores yesterday so I am alittle bit more sore today - I think the new correction works a little but this morning it is a bit more nasty     OBJECTIVE   Modality rationale: decrease pain and increase tissue extensibility to improve the patients ability to tolerate prolonged walking and standing    Additional Details    Estim:[x][]  IFC  [x] Premod NMES[][]w/US   []w/ice   [x]w/heat  Position: supine  Location: Lumbar spine (L)  15 min + 5 min set up     Traction: set up [][]                      Prone          Supine X  [][]Lbs:    [] before manual  [] after manual         32/27 min Therapeutic Exercise:  [x] See flow sheet : 5 min NC for warm up  Added front walk aways   And added side walk aways with push off with blue t-band   Rationale: increase ROM and increase strength to improve the patients ability to tolerate prolonged walking and standing     10 min Manual Therapy:   MET for L on L Sacral Torsion and (R) up-slip    Rationale: decrease pain, increase ROM, increase tissue extensibility and decrease trigger points to improve tolerance to daily activities        min Patient Education: [x] Review HEP    [] Progressed/Changed HEP based on:   [] positioning   [] body mechanics   [] transfers   [] heat/ice application        Other Objective/Functional Measures: No significant change made with therapy - continues to present with foot drop with ambulation. Reports of pain with sit to stand from low level to the (L) side of the low back   Will place patient on hold until after results of MRI   Encouraged patient to contact our office with results of MRI to assess the need for additional PT or D/C to seek other forms of treatment for current dx. No change with GOALS below      ASSESSMENT/Changes in Function:   PT services to modify and progress therapeutic interventions, address functional mobility deficits, address ROM deficits, address strength deficits, analyze and address soft tissue restrictions and assess and modify postural abnormalities to attain remaining goals. []  See Plan of Care  []  See progress note/recertification  []  See Discharge Summary         Progress towards goals / Updated goals:  1. Patient will improve L ROM equal to R in side bending and improve Ext by 5 deg to improve pt's ability to return to recreational activities      Progress Note (2/11/19): 10 degrees       Current: no change 2/18/19     2. Patient will report FOTO score of 69 to indicate improved functional mobility      Progress Note (2/11/19): 54/100      Current:no change 2/18/19    3.  Increase (B) hip extension, abduction and flexion strength to 5/5 to improve ability with ambulation and decrease trendelenburg pattern      Progress Note (2/11/19): (B) hip flexion 4+/5, (B) hip extension 4/5 and (B) hip abduction 4+/5      Current: no change 2/18/19    PLAN  [x]  Upgrade activities as tolerated     [x]  Continue plan of care  []  Update interventions per flow sheet       []  Discharge due to:_  [x]  Other: will hold PT until after MRI results    Pito Lee PTA,  2/18/2019  0745 AM

## 2019-02-20 ENCOUNTER — APPOINTMENT (OUTPATIENT)
Dept: PHYSICAL THERAPY | Age: 59
End: 2019-02-20
Payer: COMMERCIAL

## 2019-02-25 ENCOUNTER — APPOINTMENT (OUTPATIENT)
Dept: PHYSICAL THERAPY | Age: 59
End: 2019-02-25
Payer: COMMERCIAL

## 2019-02-27 ENCOUNTER — APPOINTMENT (OUTPATIENT)
Dept: PHYSICAL THERAPY | Age: 59
End: 2019-02-27
Payer: COMMERCIAL

## 2019-04-18 NOTE — PROGRESS NOTES
[facility]  [address] [place] [address],[address], [place], [place]  Phone [phone]  Fax  SUMMARY  Patient Name: Soraya Rubin : 1960   Treatment/Medical Diagnosis: Low back pain [M54.5]   Referral Source: [facility] [place], [place]     Date of Initial Visit: 1/10/2019 Attended Visits: 12 Missed Visits: 0       Summary of Care: Thank you for referring this pleasant patient. Octavio Casanova has completed 12 of 21 proposed sessions   Please refer to progress report written on 19 on 10th visit for status of patient at that time. Patient did contact office and stated that he is going to have an MRI done and requested d/c from therapy  Will discharge patient at this time and if patient contact office after today will advise patient that is any additional follow up or recommendation is needed to return to referring physician. Reason for Discharge:  [] The patient was non-compliant with attendance. [] The patient could not be contacted to schedule further therapy sessions. [] The patient has been discharged based on the orders of the referring physician. [x] The patient has requested to be discharged due to: having a MRI   [] Patient has met all goals or max benefit has been achieved      If you have any questions/comments please contact us directly at [phone]. Thank you for allowing us to assist in the care of your patient.     Therapist Signature: Jenny Rollins PTA Date: 19     Time: 11:08 AM

## 2020-09-23 ENCOUNTER — IMPORTED ENCOUNTER (OUTPATIENT)
Dept: URBAN - METROPOLITAN AREA CLINIC 1 | Facility: CLINIC | Age: 60
End: 2020-09-23

## 2020-09-23 PROBLEM — H52.03: Noted: 2020-09-23

## 2020-09-23 PROBLEM — H52.4: Noted: 2020-09-23

## 2020-09-23 PROCEDURE — S0621 ROUTINE OPHTHALMOLOGICAL EXA: HCPCS

## 2020-09-23 NOTE — PATIENT DISCUSSION
1.  Hyperopia: Rx was given for corrective spectacles if indicated. 2.  [id number]. Cataracts OU -- Observe 4. OHTN OU (CD: 0.40 / 0.40) -- IOP 23 OU today. Negative family h/o Glaucoma. Will monitor & observe for any changes / progression Return for an appointment in 1 year 40 with Dr. Celio Hensley.

## 2022-04-02 ASSESSMENT — VISUAL ACUITY
OS_SC: 20/20
OD_SC: 20/20-1
OD_CC: J1
OD_SC: 20/25-2
OS_CC: J1+
OD_CC: J1+
OS_SC: 20/20
OS_CC: J1

## 2022-04-02 ASSESSMENT — TONOMETRY
OD_IOP_MMHG: 23
OS_IOP_MMHG: 23
OD_IOP_MMHG: 24
OS_IOP_MMHG: 24

## 2022-07-29 ENCOUNTER — TRANSCRIBE ORDER (OUTPATIENT)
Dept: SCHEDULING | Age: 62
End: 2022-07-29

## 2022-07-29 DIAGNOSIS — Z13.6 SCREENING FOR CARDIOVASCULAR CONDITION: Primary | ICD-10-CM

## 2022-08-10 ENCOUNTER — HOSPITAL ENCOUNTER (OUTPATIENT)
Dept: CT IMAGING | Age: 62
Discharge: HOME | End: 2022-08-10
Attending: INTERNAL MEDICINE
Payer: SELF-PAY

## 2022-08-10 DIAGNOSIS — Z13.6 SCREENING FOR CARDIOVASCULAR CONDITION: ICD-10-CM

## 2022-08-10 PROCEDURE — 75571 CT HRT W/O DYE W/CA TEST: CPT
